# Patient Record
Sex: MALE | Race: BLACK OR AFRICAN AMERICAN
[De-identification: names, ages, dates, MRNs, and addresses within clinical notes are randomized per-mention and may not be internally consistent; named-entity substitution may affect disease eponyms.]

---

## 2017-02-04 ENCOUNTER — HOSPITAL ENCOUNTER (EMERGENCY)
Dept: HOSPITAL 71 - ER | Age: 22
Discharge: HOME | End: 2017-02-04
Payer: SELF-PAY

## 2017-02-04 DIAGNOSIS — K08.89: Primary | ICD-10-CM

## 2017-02-04 PROCEDURE — 96372 THER/PROPH/DIAG INJ SC/IM: CPT

## 2018-05-18 ENCOUNTER — CHARTING TRANS (OUTPATIENT)
Dept: OTHER | Age: 23
End: 2018-05-18

## 2018-11-01 VITALS
OXYGEN SATURATION: 100 % | HEART RATE: 74 BPM | BODY MASS INDEX: 24.22 KG/M2 | SYSTOLIC BLOOD PRESSURE: 100 MMHG | TEMPERATURE: 99.2 F | RESPIRATION RATE: 24 BRPM | HEIGHT: 68 IN | DIASTOLIC BLOOD PRESSURE: 70 MMHG | WEIGHT: 159.81 LBS

## 2021-10-05 ENCOUNTER — TELEPHONE (OUTPATIENT)
Dept: FAMILY MEDICINE CLINIC | Facility: CLINIC | Age: 26
End: 2021-10-05

## 2021-10-05 ENCOUNTER — OFFICE VISIT (OUTPATIENT)
Dept: FAMILY MEDICINE CLINIC | Facility: CLINIC | Age: 26
End: 2021-10-05

## 2021-10-05 ENCOUNTER — HOSPITAL ENCOUNTER (OUTPATIENT)
Dept: GENERAL RADIOLOGY | Facility: HOSPITAL | Age: 26
Discharge: HOME | End: 2021-10-05

## 2021-10-05 ENCOUNTER — LAB (OUTPATIENT)
Dept: LAB | Facility: HOSPITAL | Age: 26
End: 2021-10-05

## 2021-10-05 VITALS
HEIGHT: 69 IN | HEART RATE: 55 BPM | DIASTOLIC BLOOD PRESSURE: 74 MMHG | SYSTOLIC BLOOD PRESSURE: 147 MMHG | OXYGEN SATURATION: 98 % | BODY MASS INDEX: 24.59 KG/M2 | WEIGHT: 166 LBS

## 2021-10-05 DIAGNOSIS — J45.20 MILD INTERMITTENT ASTHMA WITHOUT COMPLICATION: Chronic | ICD-10-CM

## 2021-10-05 DIAGNOSIS — Z20.2 STD EXPOSURE: ICD-10-CM

## 2021-10-05 DIAGNOSIS — F17.210 CIGARETTE NICOTINE DEPENDENCE WITHOUT COMPLICATION: Chronic | ICD-10-CM

## 2021-10-05 DIAGNOSIS — F41.9 ANXIETY: ICD-10-CM

## 2021-10-05 DIAGNOSIS — Z72.0 TOBACCO USE: ICD-10-CM

## 2021-10-05 DIAGNOSIS — Z20.2 STD EXPOSURE: Primary | ICD-10-CM

## 2021-10-05 PROBLEM — J45.909 ASTHMA: Status: ACTIVE | Noted: 2021-10-05

## 2021-10-05 PROBLEM — J30.9 ALLERGIC RHINITIS: Status: ACTIVE | Noted: 2021-10-05

## 2021-10-05 LAB
25(OH)D3 SERPL-MCNC: 12.1 NG/ML (ref 30–100)
ALBUMIN SERPL-MCNC: 4.8 G/DL (ref 3.5–5.2)
ALBUMIN/GLOB SERPL: 1.8 G/DL
ALP SERPL-CCNC: 52 U/L (ref 39–117)
ALT SERPL W P-5'-P-CCNC: 16 U/L (ref 1–41)
ANION GAP SERPL CALCULATED.3IONS-SCNC: 7.5 MMOL/L (ref 5–15)
AST SERPL-CCNC: 17 U/L (ref 1–40)
BACTERIA UR QL AUTO: ABNORMAL /HPF
BASOPHILS # BLD AUTO: 0.03 10*3/MM3 (ref 0–0.2)
BASOPHILS NFR BLD AUTO: 0.6 % (ref 0–1.5)
BILIRUB SERPL-MCNC: 0.2 MG/DL (ref 0–1.2)
BILIRUB UR QL STRIP: NEGATIVE
BUN SERPL-MCNC: 11 MG/DL (ref 6–20)
BUN/CREAT SERPL: 10.9 (ref 7–25)
C TRACH RRNA CVX QL NAA+PROBE: NOT DETECTED
CALCIUM SPEC-SCNC: 10 MG/DL (ref 8.6–10.5)
CHLORIDE SERPL-SCNC: 100 MMOL/L (ref 98–107)
CHOLEST SERPL-MCNC: 228 MG/DL (ref 0–200)
CLARITY UR: CLEAR
CO2 SERPL-SCNC: 30.5 MMOL/L (ref 22–29)
COLOR UR: YELLOW
CREAT SERPL-MCNC: 1.01 MG/DL (ref 0.76–1.27)
DEPRECATED RDW RBC AUTO: 39.1 FL (ref 37–54)
EOSINOPHIL # BLD AUTO: 0.12 10*3/MM3 (ref 0–0.4)
EOSINOPHIL NFR BLD AUTO: 2.4 % (ref 0.3–6.2)
ERYTHROCYTE [DISTWIDTH] IN BLOOD BY AUTOMATED COUNT: 13 % (ref 12.3–15.4)
GFR SERPL CREATININE-BSD FRML MDRD: 108 ML/MIN/1.73
GLOBULIN UR ELPH-MCNC: 2.6 GM/DL
GLUCOSE SERPL-MCNC: 94 MG/DL (ref 65–99)
GLUCOSE UR STRIP-MCNC: NEGATIVE MG/DL
HBV SURFACE AB SER RIA-ACNC: NORMAL
HBV SURFACE AG SERPL QL IA: NORMAL
HCT VFR BLD AUTO: 39.1 % (ref 37.5–51)
HCV AB SER DONR QL: NORMAL
HDLC SERPL-MCNC: 52 MG/DL (ref 40–60)
HGB BLD-MCNC: 12.7 G/DL (ref 13–17.7)
HGB UR QL STRIP.AUTO: ABNORMAL
HIV1+2 AB SER QL: NORMAL
HYALINE CASTS UR QL AUTO: ABNORMAL /LPF
IMM GRANULOCYTES # BLD AUTO: 0.01 10*3/MM3 (ref 0–0.05)
IMM GRANULOCYTES NFR BLD AUTO: 0.2 % (ref 0–0.5)
KETONES UR QL STRIP: NEGATIVE
LDLC SERPL CALC-MCNC: 143 MG/DL (ref 0–100)
LDLC/HDLC SERPL: 2.68 {RATIO}
LEUKOCYTE ESTERASE UR QL STRIP.AUTO: NEGATIVE
LYMPHOCYTES # BLD AUTO: 2.17 10*3/MM3 (ref 0.7–3.1)
LYMPHOCYTES NFR BLD AUTO: 44 % (ref 19.6–45.3)
MCH RBC QN AUTO: 27 PG (ref 26.6–33)
MCHC RBC AUTO-ENTMCNC: 32.5 G/DL (ref 31.5–35.7)
MCV RBC AUTO: 83.2 FL (ref 79–97)
MONOCYTES # BLD AUTO: 0.5 10*3/MM3 (ref 0.1–0.9)
MONOCYTES NFR BLD AUTO: 10.1 % (ref 5–12)
N GONORRHOEA RRNA SPEC QL NAA+PROBE: NOT DETECTED
NEUTROPHILS NFR BLD AUTO: 2.1 10*3/MM3 (ref 1.7–7)
NEUTROPHILS NFR BLD AUTO: 42.7 % (ref 42.7–76)
NITRITE UR QL STRIP: NEGATIVE
NRBC BLD AUTO-RTO: 0 /100 WBC (ref 0–0.2)
PH UR STRIP.AUTO: 6.5 [PH] (ref 5–8)
PLATELET # BLD AUTO: 244 10*3/MM3 (ref 140–450)
PMV BLD AUTO: 10.6 FL (ref 6–12)
POTASSIUM SERPL-SCNC: 4.4 MMOL/L (ref 3.5–5.2)
PROT SERPL-MCNC: 7.4 G/DL (ref 6–8.5)
PROT UR QL STRIP: NEGATIVE
RBC # BLD AUTO: 4.7 10*6/MM3 (ref 4.14–5.8)
RBC # UR: ABNORMAL /HPF
REF LAB TEST METHOD: ABNORMAL
SODIUM SERPL-SCNC: 138 MMOL/L (ref 136–145)
SP GR UR STRIP: 1.02 (ref 1–1.03)
SQUAMOUS #/AREA URNS HPF: ABNORMAL /HPF
TRIGL SERPL-MCNC: 184 MG/DL (ref 0–150)
TSH SERPL DL<=0.05 MIU/L-ACNC: 3.83 UIU/ML (ref 0.27–4.2)
UROBILINOGEN UR QL STRIP: ABNORMAL
VLDLC SERPL-MCNC: 33 MG/DL (ref 5–40)
WBC # BLD AUTO: 4.93 10*3/MM3 (ref 3.4–10.8)
WBC UR QL AUTO: ABNORMAL /HPF

## 2021-10-05 PROCEDURE — 81001 URINALYSIS AUTO W/SCOPE: CPT

## 2021-10-05 PROCEDURE — 86696 HERPES SIMPLEX TYPE 2 TEST: CPT

## 2021-10-05 PROCEDURE — 86708 HEPATITIS A ANTIBODY: CPT

## 2021-10-05 PROCEDURE — 36415 COLL VENOUS BLD VENIPUNCTURE: CPT

## 2021-10-05 PROCEDURE — 87340 HEPATITIS B SURFACE AG IA: CPT

## 2021-10-05 PROCEDURE — 86803 HEPATITIS C AB TEST: CPT

## 2021-10-05 PROCEDURE — 99203 OFFICE O/P NEW LOW 30 MIN: CPT | Performed by: PHYSICIAN ASSISTANT

## 2021-10-05 PROCEDURE — 87491 CHLMYD TRACH DNA AMP PROBE: CPT

## 2021-10-05 PROCEDURE — 80061 LIPID PANEL: CPT

## 2021-10-05 PROCEDURE — 86704 HEP B CORE ANTIBODY TOTAL: CPT

## 2021-10-05 PROCEDURE — 84443 ASSAY THYROID STIM HORMONE: CPT

## 2021-10-05 PROCEDURE — 86706 HEP B SURFACE ANTIBODY: CPT

## 2021-10-05 PROCEDURE — 86592 SYPHILIS TEST NON-TREP QUAL: CPT

## 2021-10-05 PROCEDURE — 82306 VITAMIN D 25 HYDROXY: CPT

## 2021-10-05 PROCEDURE — 80053 COMPREHEN METABOLIC PANEL: CPT

## 2021-10-05 PROCEDURE — 85025 COMPLETE CBC W/AUTO DIFF WBC: CPT

## 2021-10-05 PROCEDURE — 87591 N.GONORRHOEAE DNA AMP PROB: CPT

## 2021-10-05 PROCEDURE — G0432 EIA HIV-1/HIV-2 SCREEN: HCPCS

## 2021-10-05 PROCEDURE — 86695 HERPES SIMPLEX TYPE 1 TEST: CPT

## 2021-10-05 PROCEDURE — 71046 X-RAY EXAM CHEST 2 VIEWS: CPT

## 2021-10-05 RX ORDER — ALBUTEROL SULFATE 90 UG/1
2 AEROSOL, METERED RESPIRATORY (INHALATION) EVERY 4 HOURS PRN
Qty: 18 G | Refills: 2 | Status: SHIPPED | OUTPATIENT
Start: 2021-10-05

## 2021-10-05 NOTE — PROGRESS NOTES
"Chief Complaint  Establish Care    Subjective          Maxim Stewart presents to [facility]  History of Present Illness     Pt is here to establish care with a new provider. Pt is unsure of who his last PCP was. It has been since his pediatrician.     Pt would like to discuss anxiety. Pt feels he is \"angry\" all the time.     Pt would like to get STD testing.    Pt would like to get blood work done. It has been may years since this has been done.    Pt declines flu and covid vaccine.      Pt refuses meds.  He uses THC occ for anxiety.    Occ use of nicotine    Objective   Vital Signs:   /74   Pulse 55   Ht 174 cm (68.5\")   Wt 75.3 kg (166 lb)   SpO2 98%   BMI 24.87 kg/m²     Physical Exam  Vitals and nursing note reviewed.   Constitutional:       Appearance: Normal appearance.   HENT:      Head: Normocephalic and atraumatic.   Cardiovascular:      Rate and Rhythm: Normal rate and regular rhythm.   Pulmonary:      Effort: Pulmonary effort is normal.      Breath sounds: Normal breath sounds.   Musculoskeletal:      Cervical back: Neck supple.   Neurological:      Mental Status: He is alert.   Psychiatric:         Mood and Affect: Mood normal.         Behavior: Behavior normal.        Result Review :                 Assessment and Plan    Diagnoses and all orders for this visit:    1. STD exposure (Primary)  -     Chlamydia trachomatis, Neisseria gonorrhoeae, PCR - Urine, Urine, Clean Catch; Future  -     HSV 1 & 2 - Specific Antibody, IgG; Future  -     HIV-1 / O / 2 Ag / Antibody 4th Generation; Future  -     Hepatitis C Antibody; Future  -     RPR; Future  -     Hepatitis A Antibody, Total; Future  -     Hepatitis B Surface Antigen; Future  -     Hepatitis B Core Antibody, Total; Future  -     Hepatitis B Surface Antibody; Future  -     Urinalysis With Microscopic If Indicated (No Culture) - Urine, Clean Catch; Future    2. Tobacco use    3. Cigarette nicotine dependence " without complication  Comments:  Pt smokes and not ready to quit    4. Anxiety  -     CBC & Differential; Future  -     Comprehensive Metabolic Panel; Future  -     Lipid Panel; Future  -     TSH Rfx On Abnormal To Free T4; Future  -     Vitamin D 25 Hydroxy; Future    5. Mild intermittent asthma without complication  Comments:  Stable with proair inhaler  Orders:  -     albuterol sulfate  (90 Base) MCG/ACT inhaler; Inhale 2 puffs Every 4 (Four) Hours As Needed for Wheezing.  Dispense: 18 g; Refill: 2  -     XR Chest PA & Lateral; Future         Follow Up     Return in about 6 months (around 4/5/2022).     Patient was given instructions and counseling regarding his condition or for health maintenance advice. Please see specific information pulled into the AVS if appropriate.     I have reviewed information obtained and documented by others and I have confirmed the accuracy of this documented note.    DEB Suh

## 2021-10-05 NOTE — PATIENT INSTRUCTIONS
Steps to Quit Smoking  Smoking tobacco is the leading cause of preventable death. It can affect almost every organ in the body. Smoking puts you and those around you at risk for developing many serious chronic diseases. Quitting smoking can be difficult, but it is one of the best things that you can do for your health. It is never too late to quit.  How do I get ready to quit?  When you decide to quit smoking, create a plan to help you succeed. Before you quit:  · Pick a date to quit. Set a date within the next 2 weeks to give you time to prepare.  · Write down the reasons why you are quitting. Keep this list in places where you will see it often.  · Tell your family, friends, and co-workers that you are quitting. Support from your loved ones can make quitting easier.  · Talk with your health care provider about your options for quitting smoking.  · Find out what treatment options are covered by your health insurance.  · Identify people, places, things, and activities that make you want to smoke (triggers). Avoid them.  What first steps can I take to quit smoking?  · Throw away all cigarettes at home, at work, and in your car.  · Throw away smoking accessories, such as ashtrays and lighters.  · Clean your car. Make sure to empty the ashtray.  · Clean your home, including curtains and carpets.  What strategies can I use to quit smoking?  Talk with your health care provider about combining strategies, such as taking medicines while you are also receiving in-person counseling. Using these two strategies together makes you more likely to succeed in quitting than if you used either strategy on its own.  · If you are pregnant or breastfeeding, talk with your health care provider about finding counseling or other support strategies to quit smoking. Do not take medicine to help you quit smoking unless your health care provider tells you to do so.  To quit smoking:  Quit right away  · Quit smoking completely, instead of  gradually reducing how much you smoke over a period of time. Research shows that stopping smoking right away is more successful than gradually quitting.  · Attend in-person counseling to help you build problem-solving skills. You are more likely to succeed in quitting if you attend counseling sessions regularly. Even short sessions of 10 minutes can be effective.  Take medicine  You may take medicines to help you quit smoking. Some medicines require a prescription and some you can purchase over-the-counter. Medicines may have nicotine in them to replace the nicotine in cigarettes. Medicines may:  · Help to stop cravings.  · Help to relieve withdrawal symptoms.  Your health care provider may recommend:  · Nicotine patches, gum, or lozenges.  · Nicotine inhalers or sprays.  · Non-nicotine medicine that is taken by mouth.  Find resources  Find resources and support systems that can help you to quit smoking and remain smoke-free after you quit. These resources are most helpful when you use them often. They include:  · Online chats with a counselor.  · Telephone quitlines.  · Printed self-help materials.  · Support groups or group counseling.  · Text messaging programs.  · Mobile phone apps or applications. Use apps that can help you stick to your quit plan by providing reminders, tips, and encouragement. There are many free apps for mobile devices as well as websites. Examples include Quit Guide from the CDC and smokefree.gov  What things can I do to make it easier to quit?    · Reach out to your family and friends for support and encouragement. Call telephone quitlines (3-219-QUIT-NOW), reach out to support groups, or work with a counselor for support.  · Ask people who smoke to avoid smoking around you.  · Avoid places that trigger you to smoke, such as bars, parties, or smoke-break areas at work.  · Spend time with people who do not smoke.  · Lessen the stress in your life. Stress can be a smoking trigger for some  people. To lessen stress, try:  ? Exercising regularly.  ? Doing deep-breathing exercises.  ? Doing yoga.  ? Meditating.  ? Performing a body scan. This involves closing your eyes, scanning your body from head to toe, and noticing which parts of your body are particularly tense. Try to relax the muscles in those areas.  How will I feel when I quit smoking?  Day 1 to 3 weeks  Within the first 24 hours of quitting smoking, you may start to feel withdrawal symptoms. These symptoms are usually most noticeable 2-3 days after quitting, but they usually do not last for more than 2-3 weeks. You may experience these symptoms:  · Mood swings.  · Restlessness, anxiety, or irritability.  · Trouble concentrating.  · Dizziness.  · Strong cravings for sugary foods and nicotine.  · Mild weight gain.  · Constipation.  · Nausea.  · Coughing or a sore throat.  · Changes in how the medicines that you take for unrelated issues work in your body.  · Depression.  · Trouble sleeping (insomnia).  Week 3 and afterward  After the first 2-3 weeks of quitting, you may start to notice more positive results, such as:  · Improved sense of smell and taste.  · Decreased coughing and sore throat.  · Slower heart rate.  · Lower blood pressure.  · Clearer skin.  · The ability to breathe more easily.  · Fewer sick days.  Quitting smoking can be very challenging. Do not get discouraged if you are not successful the first time. Some people need to make many attempts to quit before they achieve long-term success. Do your best to stick to your quit plan, and talk with your health care provider if you have any questions or concerns.  Summary  · Smoking tobacco is the leading cause of preventable death. Quitting smoking is one of the best things that you can do for your health.  · When you decide to quit smoking, create a plan to help you succeed.  · Quit smoking right away, not slowly over a period of time.  · When you start quitting, seek help from your  health care provider, family, or friends.  This information is not intended to replace advice given to you by your health care provider. Make sure you discuss any questions you have with your health care provider.  Document Revised: 09/11/2020 Document Reviewed: 03/07/2020  [employer] Patient Education © 2021 [employer]

## 2021-10-06 ENCOUNTER — TELEPHONE (OUTPATIENT)
Dept: FAMILY MEDICINE CLINIC | Facility: CLINIC | Age: 26
End: 2021-10-06

## 2021-10-06 LAB
HAV AB SER QL: POSITIVE
HBV CORE AB SERPL QL IA: NEGATIVE
HSV1 IGG SER IA-ACNC: <0.91 INDEX (ref 0–0.9)
HSV2 IGG SER IA-ACNC: <0.91 INDEX (ref 0–0.9)
RPR SER QL: NORMAL

## 2021-10-07 ENCOUNTER — TELEPHONE (OUTPATIENT)
Dept: FAMILY MEDICINE CLINIC | Facility: CLINIC | Age: 26
End: 2021-10-07

## 2021-10-07 DIAGNOSIS — R31.9 HEMATURIA, UNSPECIFIED TYPE: ICD-10-CM

## 2021-10-07 DIAGNOSIS — E55.9 VITAMIN D DEFICIENCY: Primary | ICD-10-CM

## 2021-10-07 RX ORDER — ERGOCALCIFEROL 1.25 MG/1
50000 CAPSULE ORAL WEEKLY
Qty: 13 CAPSULE | Refills: 1 | Status: SHIPPED | OUTPATIENT
Start: 2021-10-07

## 2021-10-07 RX ORDER — ERGOCALCIFEROL 1.25 MG/1
50000 CAPSULE ORAL WEEKLY
COMMUNITY
End: 2021-10-07 | Stop reason: SDUPTHER

## 2021-10-08 ENCOUNTER — TELEPHONE (OUTPATIENT)
Dept: FAMILY MEDICINE CLINIC | Facility: CLINIC | Age: 26
End: 2021-10-08